# Patient Record
Sex: MALE | Race: WHITE | Employment: FULL TIME | URBAN - METROPOLITAN AREA
[De-identification: names, ages, dates, MRNs, and addresses within clinical notes are randomized per-mention and may not be internally consistent; named-entity substitution may affect disease eponyms.]

---

## 2018-11-15 ENCOUNTER — OFFICE VISIT (OUTPATIENT)
Dept: AUDIOLOGY | Facility: CLINIC | Age: 58
End: 2018-11-15

## 2018-11-15 DIAGNOSIS — H90.3 SENSORINEURAL HEARING LOSS, ASYMMETRICAL: Primary | ICD-10-CM

## 2018-11-15 PROCEDURE — 99207 ZZC NO CHARGE LOS: CPT | Performed by: AUDIOLOGIST

## 2018-11-15 PROCEDURE — 92557 COMPREHENSIVE HEARING TEST: CPT | Performed by: AUDIOLOGIST

## 2018-11-15 NOTE — MR AVS SNAPSHOT
After Visit Summary   11/15/2018    Perez Paez    MRN: [id number]           Patient Information     Date Of Birth          1960        Visit Information        Provider Department      11/15/2018 2:30 PM Gemini Mcmahon [facility]        Today's Diagnoses     Sensorineural hearing loss, asymmetrical    -  1       Follow-ups after your visit        Who to contact     If you have questions or need follow up information about today's clinic visit or your schedule please contact [facility] directly at [phone].  Normal or non-critical lab and imaging results will be communicated to you by MyChart, letter or phone within 4 business days after the clinic has received the results. If you do not hear from us within 7 days, please contact the clinic through MyChart or phone. If you have a critical or abnormal lab result, we will notify you by phone as soon as possible.  Submit refill requests through [employer] or call your pharmacy and they will forward the refill request to us. Please allow 3 business days for your refill to be completed.          Additional Information About Your Visit        Care EveryWhere ID     This is your Care EveryWhere ID. This could be used by other organizations to access your [facility] medical records  [id number]         Blood Pressure from Last 3 Encounters:   No data found for BP    Weight from Last 3 Encounters:   No data found for Wt              We Performed the Following     AUDIOGRAM/TYMPANOGRAM - INTERFACE     COMPREHENSIVE HEARING TEST        Primary Care Provider Office Phone # Fax #    [facility] [phone]       [address] [place] [id number]        Equal Access to Services     ALEXANDER MARIE : Hadii manjit ku hadasho Soomaali, waaxda luqadaha, qaybta kaalmada daiana, huong mccarty. So [facility] [phone].    ATENCIÓN: Si amy dockery a salas disposición  servicios gratuitos de asistencia lingüística. Awilda juarez [phone].    We comply with applicable federal civil rights laws and [place] laws. We do not discriminate on the basis of race, color, national origin, age, disability, sex, sexual orientation, or gender identity.            Thank you!     Thank you for choosing [facility]  for your care. Our goal is always to provide you with excellent care. Hearing back from our patients is one way we can continue to improve our services. Please take a few minutes to complete the written survey that you may receive in the mail after your visit with us. Thank you!             Your Updated Medication List - Protect others around you: Learn how to safely use, store and throw away your medicines at www.disposemymeds.org.      Notice  As of 11/15/2018  4:50 PM    You have not been prescribed any medications.

## 2018-11-15 NOTE — PROGRESS NOTES
AUDIOLOGY REPORT    SUBJECTIVE:  Perez Paez is a 58 year old male who was seen in the Audiology Clinic at [facility] for an audiologic evaluation, referred by self. No previous audiograms are available at today's appointment. The patient is being seen here for a hearing evaluation for [employer].  The patient reports he is hearing better in his left ear. Long standing history of occupational and recreational noise exposure. The patient denies bilateral tinnitus and bilateral otalgia.     OBJECTIVE:  Otoscopic exam indicates ears are clear of cerumen bilaterally     Pure Tone Thresholds assessed using conventional audiometry with good  reliability from 250-8000 Hz bilaterally using insert earphones     RIGHT:  mild and severe sensorineural hearing loss    LEFT:    normal and mild-moderate sensorineural hearing loss    Speech Reception Threshold:    RIGHT: 30 dB HL    LEFT:   15 dB HL  Word Recognition Score:     RIGHT: 92% at 50 dB HL using NU-6 recorded word list.    LEFT:   96% at 50 dB HL using NU-6 recorded word list.   Note: Presentation levels were according to [employer] testing guidelines.   Sound field Recognition Score:     Binaural: 60/50 +10 dB SNR: 84% using NU-6 recorded word list      ASSESSMENT:   Normal hearing through 2000 Hz sloping to a mild to moderate sensorineural hearing loss in the left ear with a mild sloping to severe sensorineural hearing loss in the right ear.     Today s results were discussed with the patient in detail.     PLAN: It is recommended that the patient return for diagnostic hearing evaluation with tympanometry, acoustic reflex testing and ENT evaluation . Patient was counseled regarding hearing loss and impact on communication.  Please call this clinic with questions regarding these results or recommendations.    Forms filled out and given to patient.         Gemini Mcmahon M.A. CORY-AAA  Clinical audiologist [place] # [id number]  11/15/2018